# Patient Record
Sex: FEMALE | HISPANIC OR LATINO | Employment: FULL TIME | URBAN - METROPOLITAN AREA
[De-identification: names, ages, dates, MRNs, and addresses within clinical notes are randomized per-mention and may not be internally consistent; named-entity substitution may affect disease eponyms.]

---

## 2022-12-30 ENCOUNTER — HOSPITAL ENCOUNTER (EMERGENCY)
Facility: CLINIC | Age: 42
Discharge: HOME | End: 2022-12-30
Attending: PHYSICIAN ASSISTANT | Admitting: PHYSICIAN ASSISTANT
Payer: COMMERCIAL

## 2022-12-30 VITALS
OXYGEN SATURATION: 99 % | TEMPERATURE: 98 F | HEART RATE: 83 BPM | RESPIRATION RATE: 16 BRPM | DIASTOLIC BLOOD PRESSURE: 85 MMHG | SYSTOLIC BLOOD PRESSURE: 131 MMHG

## 2022-12-30 DIAGNOSIS — R22.41 NODULE OF SKIN OF RIGHT LOWER LEG: ICD-10-CM

## 2022-12-30 PROCEDURE — 99282 EMERGENCY DEPT VISIT SF MDM: CPT

## 2022-12-30 ASSESSMENT — ENCOUNTER SYMPTOMS: MYALGIAS: 1

## 2022-12-30 NOTE — ED TRIAGE NOTES
Patient reports hitting right lower leg on bookshelf yesterday, no discoloration, palpable mass causing pain and tingling in right foot.     Triage Assessment     Row Name 12/30/22 1317       Respiratory WDL    Rhythm/Pattern, Respiratory depth regular;pattern regular       Cardiac WDL    Pulse Rate & Regularity radial pulse regular       RLE Neurovascular Assessment    Temperature RLE warm    Color RLE no discoloration    Sensation RLE tingling present       Cognitive/Neuro/Behavioral WDL    Level of Consciousness alert    Orientation oriented x 4    Speech clear;spontaneous;logical    Mood/Behavior calm;cooperative       Flushing Coma Scale    Best Eye Response 4-->(E4) spontaneous    Best Motor Response 6-->(M6) obeys commands    Best Verbal Response 5-->(V5) oriented    Josiah Coma Scale Score 15

## 2022-12-30 NOTE — ED PROVIDER NOTES
History     Chief Complaint:  Leg Pain     HPI   Shanna Orta is a 42 year old female who presents to the ED for evaluation of leg pain.  Patient notes that she has had a cystlike structure to her right distal leg for approximately 6 years.  She notes that with continued standing and movement she develops pain to this area which at times seems to radiate up her right leg.  She has seen multiple providers with respect to this and has been advised to have the structure removed.  She states that she has opened it up in the past with a small amount of output, however that it came back quickly.  She denies any fevers or chills.  No skin redness.  No trauma.  No chest pain or shortness of breath. No leg swelling.    Independent Historian: yes    ROS:  Review of Systems   Musculoskeletal: Positive for myalgias.   All other systems reviewed and are negative.    Allergies:  Penicillins     Medications:    No medications    Past Medical History:    Reviewed, no pertinent past medical history    Social History:  Here alone.    PCP: No Ref-Primary, Physician     Physical Exam     Patient Vitals for the past 24 hrs:   BP Temp Temp src Pulse Resp SpO2   12/30/22 1314 131/85 98  F (36.7  C) Temporal 83 16 99 %        Physical Exam  Constitutional: Pleasant. Cooperative.  Eyes: Pupils equally round  HENT: Head is normal in appearance. Oropharynx is normal with moist mucus membranes.  Cardiovascular: Regular rate and rhythm without murmurs.  Respiratory: Normal respiratory effort, lungs clear to auscultation  Musculoskeletal: Firm nodule noted to lateral aspect of distal right leg. No fluctuance. Full ROM of bilateral lower extremities. 2+ PT pulses.  Skin: Normal, without rash. No erythema or warmth. No pustule noted.  Lymphatic: No edema  Neurologic: Cranial nerves grossly intact, normal cognition, no apparent deficits.  Psychiatric: Normal affect.  Nursing notes and vital signs reviewed.    Emergency Department Course      Emergency Department Course & Assessments:    Interventions:  Medications - No data to display     Disposition:  The patient was discharged to home.     Impression & Plan      Medical Decision Making:  Shanna Orta is a 42 year old female who presents to the ED for evaluation of leg pain.  Patient notes having a nodule to the lateral aspect of her right leg for the past 6 years.  It intermittently becomes painful, especially with walking.  She has been evaluated for this multiple times and was advised it was likely a cyst and needed the capsule to be removed.  She was told to come to the emergency department for facilitation of this.  Discussed that I would not be removing the capsule here in the emergency department.  Does not appear like an abscess.  No suggestion for DVT or superficial thrombophlebitis.  No suggestion for overlying cellulitis.  Patient referred to dermatology.  Advised Tylenol and ibuprofen for pain.  Felt patient was safe for discharge to home. Discussed reasons to return. All questions answered. Patient discharged to home in stable condition.    Diagnosis:    ICD-10-CM    1. Nodule of skin of right lower leg  R22.41            Discharge Medications:  There are no discharge medications for this patient.       12/30/2022   Ronni Sosa PA-C     This record was created at least in part using electronic voice recognition software, so please excuse any typographical errors.       Ronni Sosa PA-C  12/30/22 0937

## 2023-01-19 ENCOUNTER — OFFICE VISIT (OUTPATIENT)
Dept: DERMATOLOGY | Facility: CLINIC | Age: 43
End: 2023-01-19
Payer: COMMERCIAL

## 2023-01-19 DIAGNOSIS — L72.9 CYST OF SKIN: Primary | ICD-10-CM

## 2023-01-19 DIAGNOSIS — R22.41 NODULE OF SKIN OF RIGHT LOWER LEG: ICD-10-CM

## 2023-01-19 PROCEDURE — 99203 OFFICE O/P NEW LOW 30 MIN: CPT | Mod: GC | Performed by: DERMATOLOGY

## 2023-01-19 ASSESSMENT — PAIN SCALES - GENERAL: PAINLEVEL: MODERATE PAIN (5)

## 2023-01-19 NOTE — PROGRESS NOTES
"[facility] Dermatology Note  Encounter Date: Jan 19, 2023  Office Visit     Dermatology Problem List:  # Cyst vs angiolipoma, right lower extremity  - pending excision    ____________________________________________    Assessment & Plan:   # Cyst vs angiolipoma, right lower extremity  Physical exam as below.  Discussed that this lesion will likely not resolve spontaneously and that surgery is needed for definite removal.  Patient is interested.  Discussed risks including infection, scar, and bleeding.  - Photodocumentation today  - Will schedule in resident excision next available    Procedures Performed:   None    Follow-up: prn for new or changing lesions    Staff and Resident:     Kelton Johnson MD  PGY-3 Dermatology  Pager: [phone]    I have personally examined this patient and agree with the resident doctor's documentation and plan of care. I have reviewed and amended the resident's note above. The documentation accurately reflects my clinical observations, diagnoses, treatment and follow-up plans.     Heather Alonso MD  Dermatology Staff    ____________________________________________    CC: Derm Problem (Pt reports \"ball\" or nodule on R lower leg)    HPI:  Ms. Shanna Orta is a(n) 42 year old female who presents today as a new patient for lesion of concern.    Patient has had a ball underneath her skin on her right lower extremity for the past 6 years.  She states that this has started as a spider bite.  It has been intermittently inflamed and tender over the past couple of years and she feels that it is growing in size.  She has tried to pop it before and states she was only able to get water out.  She has no other lesions or concerns today.    Labs Reviewed:  N/A    Physical Exam:  Vitals: There were no vitals taken for this visit.  SKIN: Focused examination of RLE was performed.  - RLE with 2cm subcutaneous mobile nodule without overlying erythema or central punctum noted  - No " other lesions of concern on areas examined.     Medications:  Current Outpatient Medications   Medication     UNABLE TO FIND     No current facility-administered medications for this visit.      Past Medical History:   There is no problem list on file for this patient.    History reviewed. No pertinent past medical history.    CC Ronni Sosa PA-C  [facility]  [address] [place] [address] on close of this encounter.

## 2023-01-19 NOTE — NURSING NOTE
"Chief Complaint   Patient presents with     Derm Problem     Pt reports \"ball\" or nodule on R lower leg     Nate Cummings, EMT    "

## 2023-01-19 NOTE — LETTER
"1/19/2023       RE: Shanna Orta  [address]  [facility] [facility] [address]     Dear Colleague,    Thank you for referring your patient, Shanna Orta, to the [facility] DERMATOLOGY CLINIC [place] at [facility]. Please see a copy of my visit note below.    [facility] Dermatology Note  Encounter Date: Jan 19, 2023  Office Visit     Dermatology Problem List:  # Cyst vs angiolipoma, right lower extremity  - pending excision    ____________________________________________    Assessment & Plan:   # Cyst vs angiolipoma, right lower extremity  Physical exam as below.  Discussed that this lesion will likely not resolve spontaneously and that surgery is needed for definite removal.  Patient is interested.  Discussed risks including infection, scar, and bleeding.  - Photodocumentation today  - Will schedule in resident excision next available    Procedures Performed:   None    Follow-up: prn for new or changing lesions    Staff and Resident:     Kelton Johnson MD  PGY-3 Dermatology  Pager: [phone]    I have personally examined this patient and agree with the resident doctor's documentation and plan of care. I have reviewed and amended the resident's note above. The documentation accurately reflects my clinical observations, diagnoses, treatment and follow-up plans.     Heather Alonso MD  Dermatology Staff    ____________________________________________    CC: Derm Problem (Pt reports \"ball\" or nodule on R lower leg)    HPI:  Ms. Shanna Orta is a(n) 42 year old female who presents today as a new patient for lesion of concern.    Patient has had a ball underneath her skin on her right lower extremity for the past 6 years.  She states that this has started as a spider bite.  It has been intermittently inflamed and tender over the past couple of years and she feels that it is growing in size.  She has tried to pop it before and " states she was only able to get water out.  She has no other lesions or concerns today.    Labs Reviewed:  N/A    Physical Exam:  Vitals: There were no vitals taken for this visit.  SKIN: Focused examination of RLE was performed.  - RLE with 2cm subcutaneous mobile nodule without overlying erythema or central punctum noted  - No other lesions of concern on areas examined.     Medications:  Current Outpatient Medications   Medication     UNABLE TO FIND     No current facility-administered medications for this visit.      Past Medical History:   There is no problem list on file for this patient.    History reviewed. No pertinent past medical history.    CC Ronni Sosa PA-C  [facility]  [address] [place] [address] on close of this encounter.      Again, thank you for allowing me to participate in the care of your patient.      Sincerely,    Kelton Johnson MD

## 2023-03-23 ENCOUNTER — OFFICE VISIT (OUTPATIENT)
Dept: DERMATOLOGY | Facility: CLINIC | Age: 43
End: 2023-03-23
Payer: COMMERCIAL

## 2023-03-23 VITALS — DIASTOLIC BLOOD PRESSURE: 69 MMHG | HEART RATE: 74 BPM | SYSTOLIC BLOOD PRESSURE: 108 MMHG

## 2023-03-23 DIAGNOSIS — D48.9 NEOPLASM OF UNCERTAIN BEHAVIOR: ICD-10-CM

## 2023-03-23 DIAGNOSIS — D17.23 LIPOMA OF RIGHT LOWER EXTREMITY: Primary | ICD-10-CM

## 2023-03-23 PROCEDURE — 27632 EXC LEG/ANKLE LES SC 3 CM/>: CPT | Mod: GC | Performed by: STUDENT IN AN ORGANIZED HEALTH CARE EDUCATION/TRAINING PROGRAM

## 2023-03-23 PROCEDURE — 88305 TISSUE EXAM BY PATHOLOGIST: CPT | Mod: 26 | Performed by: DERMATOLOGY

## 2023-03-23 PROCEDURE — 88305 TISSUE EXAM BY PATHOLOGIST: CPT | Mod: TC | Performed by: STUDENT IN AN ORGANIZED HEALTH CARE EDUCATION/TRAINING PROGRAM

## 2023-03-23 RX ORDER — DOXYCYCLINE 100 MG/1
CAPSULE ORAL
Qty: 20 CAPSULE | Refills: 0 | Status: SHIPPED | OUTPATIENT
Start: 2023-03-23

## 2023-03-23 RX ORDER — DIPHENHYDRAMINE HCL 25 MG
25 CAPSULE ORAL
COMMUNITY

## 2023-03-23 RX ORDER — DOXYCYCLINE 100 MG/1
CAPSULE ORAL
Qty: 20 CAPSULE | Refills: 0 | Status: SHIPPED | OUTPATIENT
Start: 2023-03-23 | End: 2023-03-23

## 2023-03-23 NOTE — PROGRESS NOTES
Lidocaine-epinephrine 1-1:865435 % injection   5.0 mL once for one use, starting 3/23/2023 ending 3/23/2023,  2mL disp, R-0, injection  Injected by Dr. Yanes

## 2023-03-23 NOTE — NURSING NOTE
Dermatology Rooming Note    Shanna Orta's goals for this visit include:   Chief Complaint   Patient presents with     Derm Problem     Cyst excision on right leg.     Osito Deleon, EMT-B

## 2023-03-23 NOTE — PROGRESS NOTES
DERMATOLOGIC SURGERY REPORT    NAME OF PROCEDURE:  EXCISION AND INTERMEDIATE CLOSURE    Resident Surgeon:  Dr. Lilian Yanes, PGY3  Staff surgeon: Dr. Shahzad Sam    PREOPERATIVE DIAGNOSIS: LIPOMA  POSTOPERATIVE DIAGNOSIS: Same  FINAL EXCISION SIZE: 4 cm lesion with N/A mm margins  TOTAL EXCISED DIAMETER: 3 cm  FINAL REPAIR LENGTH:  3 cm    INDICATIONS:  This patient presented with a 4 cm x 4 cm rubbery mobile nodule on the lateral right leg. Excision was indicated. We discussed the principles of treatment and most likely complications including bleeding, infection, wound dehiscence, pain, nerve damage, and scarring. Informed consent was obtained and the patient underwent the procedure as follows.    PROCEDURE:  The patient was taken to the operative suite. The treatment area was anesthetized with 1% lidocaine with 1:[id number] epinephrine buffered with bicarbonate. The area was washed with Hibiclens, rinsed with saline and draped with sterile towels. The lesion was delineated and excised down to subcutaneous fat. Hemostasis was obtained by electrocoagulation. Margins were not applicable to this excision due to the strong clinical suspision of a benign lesion; the final excision size was 3 cm.     REPAIR:  In order to repair this defect while maintaining the normal anatomic relations and function, we elected to utilize an intermediate linear closure. Closure was oriented so that the wound was in the patient's natural skin tension lines. Deeper layers of the subcutaneous tissue were undermined first. Deep dermal and subcutaneous layer closure was performed using 4-0 monocryl deep, intradermal and subcutaneous sutures. Two redundant columns were removed by triangulation. Final cutaneous approximation was achieved with 4-0 Prolene simple running sutures.      The final wound length was 3 cm.  A total of 3 ml of anesthesia was administered for all surgical sites. Estimated blood loss was less than 2 mL for all surgical  sites. A sterile pressure dressing was applied and wound care instructions, with a written handout, were given. The patient was discharged from the Clinics and Surgery Center alert and ambulatory.    Note: Given surgical site at the lower extremities and patient preference, the decision was made to start the patient on antibiotic prophylaxis via of a course of doxycycline 100 mg BID for 10 days.     Resident: Lilian Yanes MD  PGY-3, Internal Medicine-Dermatology  Pager: *[phone]     Staff: Dr. Shahzad Sam

## 2023-03-23 NOTE — LETTER
3/23/2023       RE: Shanna Orta  [address]  [place] [address]     Dear Colleague,    Thank you for referring your patient, Shanna Orta, to the [facility] DERMATOLOGY CLINIC [place] at [facility]. Please see a copy of my visit note below.    DERMATOLOGIC SURGERY REPORT    NAME OF PROCEDURE:  EXCISION AND INTERMEDIATE CLOSURE    Resident Surgeon:  Dr. Lilian Yanes, PGY3  Staff surgeon: Dr. Shahzad Sam    PREOPERATIVE DIAGNOSIS: LIPOMA  POSTOPERATIVE DIAGNOSIS: Same  FINAL EXCISION SIZE: 4 cm lesion with N/A mm margins  TOTAL EXCISED DIAMETER: 3 cm  FINAL REPAIR LENGTH:  3 cm    INDICATIONS:  This patient presented with a 4 cm x 4 cm rubbery mobile nodule on the lateral right leg. Excision was indicated. We discussed the principles of treatment and most likely complications including bleeding, infection, wound dehiscence, pain, nerve damage, and scarring. Informed consent was obtained and the patient underwent the procedure as follows.    PROCEDURE:  The patient was taken to the operative suite. The treatment area was anesthetized with 1% lidocaine with 1:[id number] epinephrine buffered with bicarbonate. The area was washed with Hibiclens, rinsed with saline and draped with sterile towels. The lesion was delineated and excised down to subcutaneous fat. Hemostasis was obtained by electrocoagulation. Margins were not applicable to this excision due to the strong clinical suspision of a benign lesion; the final excision size was 3 cm.     REPAIR:  In order to repair this defect while maintaining the normal anatomic relations and function, we elected to utilize an intermediate linear closure. Closure was oriented so that the wound was in the patient's natural skin tension lines. Deeper layers of the subcutaneous tissue were undermined first. Deep dermal and subcutaneous layer closure was performed using 4-0 monocryl deep, intradermal  and subcutaneous sutures. Two redundant columns were removed by triangulation. Final cutaneous approximation was achieved with 4-0 Prolene simple running sutures.      The final wound length was 3 cm.  A total of 3 ml of anesthesia was administered for all surgical sites. Estimated blood loss was less than 2 mL for all surgical sites. A sterile pressure dressing was applied and wound care instructions, with a written handout, were given. The patient was discharged from the [facility] alert and ambulatory.    Note: Given surgical site at the lower extremities and patient preference, the decision was made to start the patient on antibiotic prophylaxis via of a course of doxycycline 100 mg BID for 10 days.     Resident: Lilian Yanes MD  PGY-3, Internal Medicine-Dermatology  Pager: *[phone]     Staff: Dr. Shahzad Sam      Lidocaine-epinephrine 1-1:502331 % injection   5.0 mL once for one use, starting 3/23/2023 ending 3/23/2023,  2mL disp, R-0, injection  Injected by Dr. Yanes

## 2023-03-26 ENCOUNTER — NURSE TRIAGE (OUTPATIENT)
Dept: NURSING | Facility: CLINIC | Age: 43
End: 2023-03-26
Payer: COMMERCIAL

## 2023-03-26 LAB
PATH REPORT.COMMENTS IMP SPEC: NORMAL
PATH REPORT.COMMENTS IMP SPEC: NORMAL
PATH REPORT.FINAL DX SPEC: NORMAL
PATH REPORT.GROSS SPEC: NORMAL
PATH REPORT.MICROSCOPIC SPEC OTHER STN: NORMAL
PATH REPORT.RELEVANT HX SPEC: NORMAL

## 2023-03-26 NOTE — TELEPHONE ENCOUNTER
"Pt reports right leg pain \"behind knee and area of knee\" which started this morning, constant. Pt reports pain radiates to calf. Pt describes pain as aching. Pt rates pain \"6-7\". Pt is not taking pain medications. Pt had lipoma removal in that leg 3/23/23 and denies pain or erythema in that area \"looks normal\".    Advised pt to be seen within four hours per protocol. Writer recommended pt be seen at [facility] as they have ultrasound capability.    Pt verbalizes understanding and agrees to plan.     Reason for Disposition    [1] Thigh or calf pain AND [2] only 1 side AND [3] present > 1 hour    Additional Information    Negative: Sounds like a life-threatening emergency to the triager    Negative: Followed a knee injury    Negative: Leg pain is main symptom    Negative: Knee swelling is main symptom    Negative: [1] Swollen joint AND [2] fever    Negative: [1] Red area or streak AND [2] fever    Negative: Patient sounds very sick or weak to the triager    Negative: [1] SEVERE pain (e.g., excruciating, unable to walk) AND [2] not improved after 2 hours of pain medicine    Negative: [1] Can't move swollen joint at all AND [2] no fever    Protocols used: KNEE PAIN-A-AH      "

## 2023-03-27 NOTE — RESULT ENCOUNTER NOTE
Called patient to discuss results. Biopsy returned as a schwannoma. Counseled patient that this represents a benign overgrowth of cells that cover nerve fibers, does not represent cancer or other malignancy, and no further treatment is required. Patient expressed understanding and appreciation.     Staffed case with Dr. Raymond as Dr. Shahzad Sam is out for the next few weeks. Dr. Raymond is cc'd as FYI only.     Clinic schedulers, please schedule this patient for suture removal (Nurse visit) at or around April 6th, thank you.

## 2023-04-06 ENCOUNTER — ALLIED HEALTH/NURSE VISIT (OUTPATIENT)
Dept: DERMATOLOGY | Facility: CLINIC | Age: 43
End: 2023-04-06
Payer: COMMERCIAL

## 2023-04-06 DIAGNOSIS — Z48.02 VISIT FOR SUTURE REMOVAL: Primary | ICD-10-CM

## 2023-04-06 PROCEDURE — 99207 PR NO CHARGE NURSE ONLY: CPT | Performed by: DERMATOLOGY

## 2023-04-06 NOTE — NURSING NOTE
"Removed continuous suture, from right outer calf without complication.  Patient tolerated procedure well, and understood all followup instructions. No S/S of infection. Slight end stage bruising (purplish near incison with green edges).    Shanna Orta comes into clinic today at the request of Dr. Yanes, Ordering Provider for Suture Removal.    This service provided today was under the supervising provider of the day Dr. Mckinney, who was available if needed.    Nitza Lee RN      Pt limping on right leg upon arrival, during discussion pt stated the back part of her right leg hurts like a muscle ache. Sometimes the pain radiates from the back of her calf up through the back of her thigh, sometimes it is just behind the knee. She walked 8 blocks the other day and afterwards it hurt so bad that she felt like she was going to throw up and couldn't sleep that night from the pain. Pt states she has not taken anything to try to help the pain such as Tylenol. Pt denies pain around the incision site. Pt states she has been limping and \"Babying\" the right leg since biopsy was completed. Will send message to Dr. Yanes's team for possible follow up. Informed pt to reach out to primary care physician, pt stated she does not have one.    "

## 2023-04-07 ENCOUNTER — DOCUMENTATION ONLY (OUTPATIENT)
Dept: DERMATOLOGY | Facility: CLINIC | Age: 43
End: 2023-04-07
Payer: COMMERCIAL

## 2023-04-07 NOTE — PROGRESS NOTES
Contacted as dermatology resident on-call by nurse who removed sutures from patient's excision site on her right lower leg yesterday. Patient had excision of a schwannoma on R lower leg on 3/23/23.  Suture removal visit patient reported pain with walking of the area.  Nursing at time of suture removal noted no signs of infection of the site.    Called and talked with patient.  Patient reports soreness at the site as well as behind the knee and sometimes up the back of the leg with walking.  Says that it feels like a muscle type pain.  Patient wondering if this is normal.  Taking Tylenol for the pain.    Discussed with patient that some persistent soreness of the area can be normal.  Recommended that she slowly increase her activity as tolerated.  Also recommended ice, massaging, and stretching of the right leg regularly.  Encouraged the patient to let us know if this pain does not improve over the next week.    Discussed with Dr. Cortez.     Kennedi Ocampo MD  Dermatology Resident, PGY4

## 2023-04-30 ENCOUNTER — HOSPITAL ENCOUNTER (EMERGENCY)
Facility: CLINIC | Age: 43
Discharge: HOME | End: 2023-04-30
Attending: EMERGENCY MEDICINE | Admitting: EMERGENCY MEDICINE
Payer: COMMERCIAL

## 2023-04-30 VITALS
DIASTOLIC BLOOD PRESSURE: 74 MMHG | SYSTOLIC BLOOD PRESSURE: 115 MMHG | RESPIRATION RATE: 18 BRPM | HEART RATE: 72 BPM | OXYGEN SATURATION: 100 % | TEMPERATURE: 97.2 F

## 2023-04-30 DIAGNOSIS — R52 PAIN OF RIGHT SIDE OF BODY: ICD-10-CM

## 2023-04-30 DIAGNOSIS — R74.8 ELEVATED LIVER ENZYMES: ICD-10-CM

## 2023-04-30 DIAGNOSIS — R51.9 NONINTRACTABLE HEADACHE, UNSPECIFIED CHRONICITY PATTERN, UNSPECIFIED HEADACHE TYPE: ICD-10-CM

## 2023-04-30 LAB
ALBUMIN SERPL BCG-MCNC: 4.5 G/DL (ref 3.5–5.2)
ALBUMIN UR-MCNC: NEGATIVE MG/DL
ALP SERPL-CCNC: 82 U/L (ref 35–104)
ALT SERPL W P-5'-P-CCNC: 93 U/L (ref 10–35)
ANION GAP SERPL CALCULATED.3IONS-SCNC: 12 MMOL/L (ref 7–15)
APPEARANCE UR: CLEAR
AST SERPL W P-5'-P-CCNC: 73 U/L (ref 10–35)
ATRIAL RATE - MUSE: 69 BPM
BILIRUB DIRECT SERPL-MCNC: <0.2 MG/DL (ref 0–0.3)
BILIRUB SERPL-MCNC: 0.6 MG/DL
BILIRUB UR QL STRIP: NEGATIVE
BUN SERPL-MCNC: 8.5 MG/DL (ref 6–20)
CALCIUM SERPL-MCNC: 9.4 MG/DL (ref 8.6–10)
CHLORIDE SERPL-SCNC: 104 MMOL/L (ref 98–107)
COLOR UR AUTO: YELLOW
CREAT SERPL-MCNC: 0.69 MG/DL (ref 0.51–0.95)
DEPRECATED HCO3 PLAS-SCNC: 22 MMOL/L (ref 22–29)
DIASTOLIC BLOOD PRESSURE - MUSE: NORMAL MMHG
ERYTHROCYTE [DISTWIDTH] IN BLOOD BY AUTOMATED COUNT: 13.2 % (ref 10–15)
FLUAV RNA SPEC QL NAA+PROBE: NEGATIVE
FLUBV RNA RESP QL NAA+PROBE: NEGATIVE
GFR SERPL CREATININE-BSD FRML MDRD: >90 ML/MIN/1.73M2
GLUCOSE SERPL-MCNC: 99 MG/DL (ref 70–99)
GLUCOSE UR STRIP-MCNC: NEGATIVE MG/DL
HCG UR QL: NEGATIVE
HCT VFR BLD AUTO: 41.5 % (ref 35–47)
HGB BLD-MCNC: 14 G/DL (ref 11.7–15.7)
HGB UR QL STRIP: ABNORMAL
INTERPRETATION ECG - MUSE: NORMAL
KETONES UR STRIP-MCNC: NEGATIVE MG/DL
LEUKOCYTE ESTERASE UR QL STRIP: NEGATIVE
MCH RBC QN AUTO: 29.9 PG (ref 26.5–33)
MCHC RBC AUTO-ENTMCNC: 33.7 G/DL (ref 31.5–36.5)
MCV RBC AUTO: 89 FL (ref 78–100)
MUCOUS THREADS #/AREA URNS LPF: PRESENT /LPF
NITRATE UR QL: NEGATIVE
P AXIS - MUSE: 18 DEGREES
PH UR STRIP: 5.5 [PH] (ref 5–7)
PLATELET # BLD AUTO: 279 10E3/UL (ref 150–450)
POTASSIUM SERPL-SCNC: 4.3 MMOL/L (ref 3.4–5.3)
PR INTERVAL - MUSE: 166 MS
PROT SERPL-MCNC: 7.2 G/DL (ref 6.4–8.3)
QRS DURATION - MUSE: 78 MS
QT - MUSE: 412 MS
QTC - MUSE: 441 MS
R AXIS - MUSE: 26 DEGREES
RBC # BLD AUTO: 4.68 10E6/UL (ref 3.8–5.2)
RBC URINE: 8 /HPF
RSV RNA SPEC NAA+PROBE: NEGATIVE
SARS-COV-2 RNA RESP QL NAA+PROBE: NEGATIVE
SODIUM SERPL-SCNC: 138 MMOL/L (ref 136–145)
SP GR UR STRIP: 1.03 (ref 1–1.03)
SQUAMOUS EPITHELIAL: 4 /HPF
SYSTOLIC BLOOD PRESSURE - MUSE: NORMAL MMHG
T AXIS - MUSE: 8 DEGREES
TSH SERPL DL<=0.005 MIU/L-ACNC: 1.67 UIU/ML (ref 0.3–4.2)
UROBILINOGEN UR STRIP-MCNC: NORMAL MG/DL
VENTRICULAR RATE- MUSE: 69 BPM
WBC # BLD AUTO: 5.7 10E3/UL (ref 4–11)
WBC URINE: 1 /HPF

## 2023-04-30 PROCEDURE — 99284 EMERGENCY DEPT VISIT MOD MDM: CPT | Mod: 25,CS

## 2023-04-30 PROCEDURE — 85027 COMPLETE CBC AUTOMATED: CPT | Performed by: EMERGENCY MEDICINE

## 2023-04-30 PROCEDURE — 96375 TX/PRO/DX INJ NEW DRUG ADDON: CPT

## 2023-04-30 PROCEDURE — 81001 URINALYSIS AUTO W/SCOPE: CPT | Performed by: EMERGENCY MEDICINE

## 2023-04-30 PROCEDURE — 87637 SARSCOV2&INF A&B&RSV AMP PRB: CPT | Performed by: EMERGENCY MEDICINE

## 2023-04-30 PROCEDURE — 96374 THER/PROPH/DIAG INJ IV PUSH: CPT

## 2023-04-30 PROCEDURE — 84443 ASSAY THYROID STIM HORMONE: CPT | Performed by: EMERGENCY MEDICINE

## 2023-04-30 PROCEDURE — 80048 BASIC METABOLIC PNL TOTAL CA: CPT | Performed by: EMERGENCY MEDICINE

## 2023-04-30 PROCEDURE — 80053 COMPREHEN METABOLIC PANEL: CPT | Performed by: EMERGENCY MEDICINE

## 2023-04-30 PROCEDURE — 258N000003 HC RX IP 258 OP 636: Performed by: EMERGENCY MEDICINE

## 2023-04-30 PROCEDURE — 96361 HYDRATE IV INFUSION ADD-ON: CPT

## 2023-04-30 PROCEDURE — 93005 ELECTROCARDIOGRAM TRACING: CPT

## 2023-04-30 PROCEDURE — C9803 HOPD COVID-19 SPEC COLLECT: HCPCS

## 2023-04-30 PROCEDURE — 81025 URINE PREGNANCY TEST: CPT | Performed by: EMERGENCY MEDICINE

## 2023-04-30 PROCEDURE — 82248 BILIRUBIN DIRECT: CPT | Performed by: EMERGENCY MEDICINE

## 2023-04-30 PROCEDURE — 250N000011 HC RX IP 250 OP 636: Performed by: EMERGENCY MEDICINE

## 2023-04-30 PROCEDURE — 36415 COLL VENOUS BLD VENIPUNCTURE: CPT | Performed by: EMERGENCY MEDICINE

## 2023-04-30 RX ORDER — DIPHENHYDRAMINE HCL 25 MG
25 CAPSULE ORAL ONCE
Status: DISCONTINUED | OUTPATIENT
Start: 2023-04-30 | End: 2023-04-30

## 2023-04-30 RX ORDER — KETOROLAC TROMETHAMINE 15 MG/ML
15 INJECTION, SOLUTION INTRAMUSCULAR; INTRAVENOUS ONCE
Status: COMPLETED | OUTPATIENT
Start: 2023-04-30 | End: 2023-04-30

## 2023-04-30 RX ORDER — DIPHENHYDRAMINE HYDROCHLORIDE 50 MG/ML
25 INJECTION INTRAMUSCULAR; INTRAVENOUS ONCE
Status: COMPLETED | OUTPATIENT
Start: 2023-04-30 | End: 2023-04-30

## 2023-04-30 RX ORDER — METOCLOPRAMIDE HYDROCHLORIDE 5 MG/ML
10 INJECTION INTRAMUSCULAR; INTRAVENOUS ONCE
Status: COMPLETED | OUTPATIENT
Start: 2023-04-30 | End: 2023-04-30

## 2023-04-30 RX ADMIN — KETOROLAC TROMETHAMINE 15 MG: 15 INJECTION, SOLUTION INTRAMUSCULAR; INTRAVENOUS at 13:01

## 2023-04-30 RX ADMIN — METOCLOPRAMIDE 10 MG: 5 INJECTION, SOLUTION INTRAMUSCULAR; INTRAVENOUS at 13:04

## 2023-04-30 RX ADMIN — DIPHENHYDRAMINE HYDROCHLORIDE 25 MG: 50 INJECTION, SOLUTION INTRAMUSCULAR; INTRAVENOUS at 13:06

## 2023-04-30 RX ADMIN — SODIUM CHLORIDE 1000 ML: 9 INJECTION, SOLUTION INTRAVENOUS at 13:00

## 2023-04-30 ASSESSMENT — ACTIVITIES OF DAILY LIVING (ADL)
ADLS_ACUITY_SCORE: 35
ADLS_ACUITY_SCORE: 35

## 2023-04-30 NOTE — ED TRIAGE NOTES
Pt sts she has muscles aches mostly on right side for over 1 week     Triage Assessment     Row Name 04/30/23 0949       Triage Assessment (Adult)    Airway WDL WDL       Respiratory WDL    Respiratory WDL WDL       Skin Circulation/Temperature WDL    Skin Circulation/Temperature WDL WDL       Cardiac WDL    Cardiac WDL WDL       Peripheral/Neurovascular WDL    Peripheral Neurovascular WDL WDL       Cognitive/Neuro/Behavioral WDL    Cognitive/Neuro/Behavioral WDL WDL

## 2023-04-30 NOTE — ED NOTES
"Patient reports headache pain is \"very bad\". Meds given per orders. Pt repositioned. Lights dimmed. Call light reviewed and in reach.   "

## 2023-04-30 NOTE — DISCHARGE INSTRUCTIONS
Discharge Instructions  Headache    You were seen today for a headache. Headaches may be caused by many different things such as muscle tension, sinus inflammation, anxiety and stress, having too little sleep, too much alcohol, some medical conditions or injury. You may have a migraine, which is caused by changes in the blood vessels in your head.  At this time your provider does not find that your headache is a sign of anything dangerous or life-threatening.  However, sometimes the signs of serious illness do not show up right away.      Generally, every Emergency Department visit should have a follow-up clinic visit with either a primary or a specialty clinic/provider. Please follow-up as instructed by your emergency provider today.    Return to the Emergency Department if:  You get a new fever of 100.4 F or higher.  Your headache gets much worse.  You get a stiff neck with your headache.  You get a new headache that is significantly different or worse than headaches you have had before.  You are vomiting (throwing up) and cannot keep food or water down.  You have blurry or double vision or other problems with your eyes.  You have a new weakness on one side of your body.  You have difficulty with balance which is new.  You or your family thinks you are confused.  You have a seizure.    What can I do to help myself?  Pain medications - You may take a pain medication such as Tylenol  (acetaminophen), Advil , Motrin  (ibuprofen) or Aleve  (naproxen).  Take a pain reliever as soon as you notice symptoms.  Starting medications as soon as you start to have symptoms may lessen the amount of pain you have.  Relaxing in a quiet, dark room may help.  Get enough sleep and eat meals regularly.  You may need to watch for certain foods or other things which may trigger your headaches.  Keeping a journal of your headaches and possible triggers may help you and your primary provider to identify things which you should avoid which  may be causing your headaches.  If you were given a prescription for medicine here today, be sure to read all of the information (including the package insert) that comes with your prescription.  This will include important information about the medicine, its side effects, and any warnings that you need to know about.  The pharmacist who fills the prescription can provide more information and answer questions you may have about the medicine.  If you have questions or concerns that the pharmacist cannot address, please call or return to the Emergency Department.   Remember that you can always come back to the Emergency Department if you are not able to see your regular provider in the amount of time listed above, if you get any new symptoms, or if there is anything that worries you.    Discharge Instructions  Incidental Findings    An incidental finding is something unexpected that was found while you were being treated and is felt to not be related to the reason that you came to the Emergency Department.  While this finding is not an emergency, you need to follow up with your primary provider (or occasionally a specialist) to determine if anything should be done about it.    These findings can come from:  Checking your vital signs (example: high blood pressure).  Taking your history (example: unexplained weight loss).  The physical exam (example: a heart murmur).  Laboratory study (example: anemia or low blood count).  X-rays/ultrasound/CT or other imaging (example: an unexplained mass).    Generally, every Emergency Department visit should have a follow-up clinic visit with either a primary or a specialty clinic/provider. Please follow-up as instructed by your emergency provider today.    Return to the Emergency Department if:  Your condition worsens.  You develop unexpected pain.  You now develop new symptoms or have new concerns.  If you were given a prescription for medicine here today, be sure to read all of  the information (including the package insert) that comes with your prescription.  This will include important information about the medicine, its side effects, and any warnings that you need to know about.  The pharmacist who fills the prescription can provide more information and answer questions you may have about the medicine.  If you have questions or concerns that the pharmacist cannot address, please call or return to the Emergency Department.   Remember that you can always come back to the Emergency Department if you are not able to see your regular provider in the amount of time listed above, if you get any new symptoms, or if there is anything that worries you.

## 2023-04-30 NOTE — ED PROVIDER NOTES
History     Chief Complaint:  Fever       HPI   Shanna Orta is a 43 year old female who presents for evaluation of right-sided body pain and headache.  Symptoms have been ongoing for a while now.  She recently had a cyst removed from her right lower leg that turned out to be a schwannoma.  She been having increasing pain in that area and then also developed pain throughout her entire right leg and right arm today.  This has been ongoing for the last few days to week.  She does not have numbness or weakness of the right arm or right leg.  She is also having a headache which is new for her.  Is been ongoing for a few days.  No fevers or chills.  No trauma to the head.  No vision changes.  No numbness or weakness.  She is otherwise healthy and takes no medications.  No known sick contacts.      Independent Historian:   None - Patient Only    Review of External Notes: Dermatology note dated 3/23/2023 where patient was notified of results of the lesion removed from the right shin was a schwannoma no further treatment or testing is needed.    ROS:  Review of Systems   Constitutional: Negative for chills and fever.   Respiratory: Negative for cough and shortness of breath.    Cardiovascular: Negative for chest pain.   Gastrointestinal: Negative for abdominal pain, diarrhea, nausea and vomiting.   Genitourinary: Negative for dysuria.   Skin: Positive for wound (Surgical). Negative for rash.   Neurological: Positive for headaches. Negative for dizziness, speech difficulty, weakness, light-headedness and numbness.   Psychiatric/Behavioral: Negative for confusion.   All other systems reviewed and are negative.      Allergies:  Penicillins     Medications:    diphenhydrAMINE (BENADRYL) 25 MG capsule  doxycycline monohydrate (MONODOX) 100 MG capsule  UNABLE TO FIND        Past Medical History:    No past medical history on file.    Past Surgical History:    No past surgical history on file.     Family History:     family history is not on file.    Social History:   reports that she has never smoked. She has never used smokeless tobacco.  PCP: No Ref-Primary, Physician     Physical Exam   Patient Vitals for the past 24 hrs:   BP Temp Temp src Pulse Resp SpO2   04/30/23 1400 -- -- -- -- -- 100 %   04/30/23 1345 -- -- -- -- -- 100 %   04/30/23 1330 110/81 -- -- 72 -- 100 %   04/30/23 1230 114/76 -- -- -- -- 97 %   04/30/23 0946 115/69 97.2  F (36.2  C) Temporal 80 18 98 %        Physical Exam  Constitutional: Well appearing.  HEENT: Atraumatic.  PERRL.  EOMI.  Moist mucous membranes.  Neck: Soft.  Supple.  Cardiac: Regular rate and rhythm.  No murmur or rub.  Distal pulses are intact and symmetric.  Respiratory: Clear to auscultation bilaterally.  No respiratory distress.  No wheezing, rhonchi, or rales.  Abdomen: Soft and nontender.  No rebound or guarding.  Nondistended.  Musculoskeletal: No edema.  Normal range of motion.  Neurologic: Alert and oriented x3.  Normal tone and bulk.  No facial drooping.  Normal speech.  5/5 strength in bilateral upper and lower extremities.  Sensation to light touch intact throughout.  Normal gait.  Skin: Well-healing surgical scar on the right anterior shin with no surrounding erythema, induration, or fluctuance.  No rashes.  No edema.  Psych: Normal affect.  Normal behavior.      Emergency Department Course     ECG results from 04/30/23   EKG 12-lead, tracing only     Value    Systolic Blood Pressure     Diastolic Blood Pressure     Ventricular Rate 69    Atrial Rate 69    CA Interval 166    QRS Duration 78        QTc 441    P Axis 18    R AXIS 26    T Axis 8    Interpretation ECG      Sinus rhythm with sinus arrhythmia  Low voltage QRS  Borderline ECG  No previous ECGs available  Confirmed by GENERATED REPORT, COMPUTER ([id number]),  Nat Jaffe ([id number]) on 4/30/2023 2:38:38 PM         Laboratory:  Labs Ordered and Resulted from Time of ED Arrival to Time of ED Departure   HEPATIC  FUNCTION PANEL - Abnormal       Result Value    Protein Total 7.2      Albumin 4.5      Bilirubin Total 0.6      Alkaline Phosphatase 82      AST 73 (*)     ALT 93 (*)     Bilirubin Direct <0.20     ROUTINE UA WITH MICROSCOPIC REFLEX TO CULTURE - Abnormal    Color Urine Yellow      Appearance Urine Clear      Glucose Urine Negative      Bilirubin Urine Negative      Ketones Urine Negative      Specific Gravity Urine 1.027      Blood Urine Small (*)     pH Urine 5.5      Protein Albumin Urine Negative      Urobilinogen Urine Normal      Nitrite Urine Negative      Leukocyte Esterase Urine Negative      Mucus Urine Present (*)     RBC Urine 8 (*)     WBC Urine 1      Squamous Epithelials Urine 4 (*)    CBC WITH PLATELETS - Normal    WBC Count 5.7      RBC Count 4.68      Hemoglobin 14.0      Hematocrit 41.5      MCV 89      MCH 29.9      MCHC 33.7      RDW 13.2      Platelet Count 279     BASIC METABOLIC PANEL - Normal    Sodium 138      Potassium 4.3      Chloride 104      Carbon Dioxide (CO2) 22      Anion Gap 12      Urea Nitrogen 8.5      Creatinine 0.69      Calcium 9.4      Glucose 99      GFR Estimate >90     HCG QUALITATIVE URINE - Normal    hCG Urine Qualitative Negative     INFLUENZA A/B, RSV, & SARS-COV2 PCR - Normal    Influenza A PCR Negative      Influenza B PCR Negative      RSV PCR Negative      SARS CoV2 PCR Negative     TSH WITH FREE T4 REFLEX - Normal    TSH 1.67          Procedures     Emergency Department Course & Assessments:             Interventions:  Medications   0.9% sodium chloride BOLUS (0 mLs Intravenous Stopped 4/30/23 1340)   metoclopramide (REGLAN) injection 10 mg (10 mg Intravenous $Given 4/30/23 1304)   ketorolac (TORADOL) injection 15 mg (15 mg Intravenous $Given 4/30/23 1301)   diphenhydrAMINE (BENADRYL) injection 25 mg (25 mg Intravenous $Given 4/30/23 1306)        Assessments:      Independent Interpretation (X-rays, CTs, rhythm strip):  None    Consultations/Discussion of  Management or Tests:  None        Social Determinants of Health affecting care:   None    Disposition:  The patient was discharged to home.     Impression & Plan    Medical Decision Making:  Shanna Orta is a 43-year-old woman who is afebrile and hemodynamically stable.  She is neurologically intact with no focal deficits.  There is no evidence of acute CVA.  I see no occasion for emergent head imaging.  She has no fever or meningismus my concern for meningitis/encephalitis is exceedingly low.  She has no numbness or weakness and has tingling on just one side of her body making a transverse myelitis or other cord issue less likely.  I gave her the migraine cocktail as above with complete resolution of all of her symptoms.  We discussed potential migraines versus other headache syndrome versus other.  At this point, I think she is safe to discharge home with very close primary care follow-up.  There is no evidence of any complication from the removal of her small lesion on the shin with no signs of infection and she is neurovascularly intact in the lower leg.  No indication for imaging.  She is in agreement with this plan and feels much more comfortable and would like to go home.  Strict return precautions were given and her questions were answered.  She was in no distress at time of discharge.    Diagnosis:    ICD-10-CM    1. Nonintractable headache, unspecified chronicity pattern, unspecified headache type  R51.9       2. Pain of right side of body  R52       3. Elevated liver enzymes  R74.8            Discharge Medications:  New Prescriptions    No medications on file      4/30/2023   Duane Mary MD Salay, Nicholas J, MD  05/01/23 0906

## 2023-04-30 NOTE — ED NOTES
"Patient resting on side. RN at bedside and patient opened her eyes. Patient reports her headache pain is relieved, stated \"so much better!\"   "

## 2023-05-01 ASSESSMENT — ENCOUNTER SYMPTOMS
VOMITING: 0
COUGH: 0
ABDOMINAL PAIN: 0
NUMBNESS: 0
DIZZINESS: 0
CHILLS: 0
NAUSEA: 0
SPEECH DIFFICULTY: 0
FEVER: 0
CONFUSION: 0
WEAKNESS: 0
DIARRHEA: 0
WOUND: 1
HEADACHES: 1
DYSURIA: 0
LIGHT-HEADEDNESS: 0
SHORTNESS OF BREATH: 0

## 2024-02-23 ENCOUNTER — OFFICE VISIT (OUTPATIENT)
Dept: URGENT CARE | Facility: URGENT CARE | Age: 44
End: 2024-02-23
Payer: COMMERCIAL

## 2024-02-23 VITALS
SYSTOLIC BLOOD PRESSURE: 117 MMHG | DIASTOLIC BLOOD PRESSURE: 68 MMHG | TEMPERATURE: 100.2 F | HEART RATE: 104 BPM | RESPIRATION RATE: 16 BRPM | WEIGHT: 174 LBS | OXYGEN SATURATION: 97 %

## 2024-02-23 DIAGNOSIS — R07.0 THROAT PAIN: ICD-10-CM

## 2024-02-23 DIAGNOSIS — R60.9 SWELLING: ICD-10-CM

## 2024-02-23 DIAGNOSIS — J10.1 INFLUENZA B: Primary | ICD-10-CM

## 2024-02-23 DIAGNOSIS — R05.1 ACUTE COUGH: ICD-10-CM

## 2024-02-23 LAB
ALBUMIN SERPL BCG-MCNC: 4.7 G/DL (ref 3.5–5.2)
ALP SERPL-CCNC: 86 U/L (ref 40–150)
ALT SERPL W P-5'-P-CCNC: 62 U/L (ref 0–50)
ANION GAP SERPL CALCULATED.3IONS-SCNC: 11 MMOL/L (ref 7–15)
AST SERPL W P-5'-P-CCNC: 45 U/L (ref 0–45)
BASOPHILS # BLD AUTO: 0 10E3/UL (ref 0–0.2)
BASOPHILS NFR BLD AUTO: 0 %
BILIRUB SERPL-MCNC: 0.4 MG/DL
BUN SERPL-MCNC: 6.5 MG/DL (ref 6–20)
CALCIUM SERPL-MCNC: 9.6 MG/DL (ref 8.6–10)
CHLORIDE SERPL-SCNC: 99 MMOL/L (ref 98–107)
CREAT SERPL-MCNC: 0.75 MG/DL (ref 0.51–0.95)
DEPRECATED HCO3 PLAS-SCNC: 26 MMOL/L (ref 22–29)
DEPRECATED S PYO AG THROAT QL EIA: NEGATIVE
EGFRCR SERPLBLD CKD-EPI 2021: >90 ML/MIN/1.73M2
EOSINOPHIL # BLD AUTO: 0 10E3/UL (ref 0–0.7)
EOSINOPHIL NFR BLD AUTO: 0 %
ERYTHROCYTE [DISTWIDTH] IN BLOOD BY AUTOMATED COUNT: 13.3 % (ref 10–15)
FLUAV AG SPEC QL IA: NEGATIVE
FLUBV AG SPEC QL IA: POSITIVE
GLUCOSE SERPL-MCNC: 100 MG/DL (ref 70–99)
GROUP A STREP BY PCR: NOT DETECTED
HCT VFR BLD AUTO: 45.1 % (ref 35–47)
HGB BLD-MCNC: 14.7 G/DL (ref 11.7–15.7)
IMM GRANULOCYTES # BLD: 0 10E3/UL
IMM GRANULOCYTES NFR BLD: 0 %
LYMPHOCYTES # BLD AUTO: 1.1 10E3/UL (ref 0.8–5.3)
LYMPHOCYTES NFR BLD AUTO: 22 %
MCH RBC QN AUTO: 29.5 PG (ref 26.5–33)
MCHC RBC AUTO-ENTMCNC: 32.6 G/DL (ref 31.5–36.5)
MCV RBC AUTO: 90 FL (ref 78–100)
MONOCYTES # BLD AUTO: 0.6 10E3/UL (ref 0–1.3)
MONOCYTES NFR BLD AUTO: 12 %
NEUTROPHILS # BLD AUTO: 3.5 10E3/UL (ref 1.6–8.3)
NEUTROPHILS NFR BLD AUTO: 66 %
PLATELET # BLD AUTO: 250 10E3/UL (ref 150–450)
POTASSIUM SERPL-SCNC: 4 MMOL/L (ref 3.4–5.3)
PROT SERPL-MCNC: 8 G/DL (ref 6.4–8.3)
RBC # BLD AUTO: 4.99 10E6/UL (ref 3.8–5.2)
SODIUM SERPL-SCNC: 136 MMOL/L (ref 135–145)
TSH SERPL DL<=0.005 MIU/L-ACNC: 1.76 UIU/ML (ref 0.3–4.2)
WBC # BLD AUTO: 5.2 10E3/UL (ref 4–11)

## 2024-02-23 PROCEDURE — 87804 INFLUENZA ASSAY W/OPTIC: CPT | Performed by: PHYSICIAN ASSISTANT

## 2024-02-23 PROCEDURE — 84443 ASSAY THYROID STIM HORMONE: CPT | Performed by: PHYSICIAN ASSISTANT

## 2024-02-23 PROCEDURE — 85025 COMPLETE CBC W/AUTO DIFF WBC: CPT | Performed by: PHYSICIAN ASSISTANT

## 2024-02-23 PROCEDURE — 36415 COLL VENOUS BLD VENIPUNCTURE: CPT | Performed by: PHYSICIAN ASSISTANT

## 2024-02-23 PROCEDURE — 87651 STREP A DNA AMP PROBE: CPT | Performed by: PHYSICIAN ASSISTANT

## 2024-02-23 PROCEDURE — 99204 OFFICE O/P NEW MOD 45 MIN: CPT | Performed by: PHYSICIAN ASSISTANT

## 2024-02-23 PROCEDURE — 80053 COMPREHEN METABOLIC PANEL: CPT | Performed by: PHYSICIAN ASSISTANT

## 2024-02-23 RX ORDER — BENZONATATE 100 MG/1
CAPSULE ORAL
Qty: 45 CAPSULE | Refills: 0 | Status: SHIPPED | OUTPATIENT
Start: 2024-02-23

## 2024-02-23 NOTE — LETTER
February 23, 2024      Shanna Orta  [address]  [facility] [address]        To Whom It May Concern:    Shanna Orta  was seen  in the clinic today. Please excuse them from work/school until symptoms improve ans she is 24 hurs fever free without medication.            Sincerely,        Darien Murray PA-C

## 2024-02-23 NOTE — PROGRESS NOTES
Chief Complaint   Patient presents with    Urgent Care    URI     Cough, sore throat, runny nose, body aches and fever x 3 days - states her child was positive for strep and influenza - Negative at home Covid test last night - Took Bendryl, acetaminophen and Certirizine yesterday       ASSESSMENT/PLAN:  Shanna was seen today for urgent care and uri.    Diagnoses and all orders for this visit:    Influenza B  -     benzonatate (TESSALON) 100 MG capsule; Take 1-2 capsules by mouth up to 3 x a day as needed with food    Acute cough  -     Influenza A & B Antigen - Clinic Collect    Throat pain  -     Streptococcus A Rapid Screen w/Reflex to PCR - Clinic Collect  -     Group A Streptococcus PCR Throat Swab    Swelling  -     CBC with platelets and differential; Future  -     Comprehensive metabolic panel (BMP + Alb, Alk Phos, ALT, AST, Total. Bili, TP); Future  -     TSH with free T4 reflex; Future  -     CBC with platelets and differential  -     Comprehensive metabolic panel (BMP + Alb, Alk Phos, ALT, AST, Total. Bili, TP)  -     TSH with free T4 reflex    Flu be positive.  Strep negative.  Reassuring exam.  No pitting edema noted.  No calf tenderness.  Doubt PE.  Will check some labs due to the reported sensation of swelling.  Up 10 pounds since last year.  Recommend scheduling an appointment with PCP in 1 to 2 weeks.  If feeling 100% better okay to cancel but they can follow-up with the swelling.  The influenza should resolve over the next several days  Symptomatic cares and expected length of symptoms discussed at length and outlined in AVS  Return precautions also discussed    Darien Murray PA-C      SUBJECTIVE:  Shanna is a 44 year old female who presents to urgent care with 3 days of cough, sore throat, runny nose, fever and bodyaches.  She has fatigue and malaise.  Her child tested positive for strep and flu.  She is also felt low appetite and over the last week she is felt generalized swelling.  Her pants  do not seem to be fitting her as well.  She states she normally weighs in the 150s but it noticed that her weight was 174 today.  Looking back in her records she had an annual exam 5/24/2023 and her weight was 164  No chest pain, shortness of breath.      ROS: Pertinent ROS neg other than the symptoms noted above in the HPI.     OBJECTIVE:  /68 (BP Location: Left arm, Patient Position: Sitting, Cuff Size: Adult Large)   Pulse 104   Temp 100.2  F (37.9  C) (Tympanic)   Resp 16   Wt 78.9 kg (174 lb)   SpO2 97%   Breastfeeding No    GENERAL: alert and no distress  EYES: Eyes grossly normal to inspection, PERRL and conjunctivae and sclerae normal  HENT: ear canals and TM's normal, nose and mouth without ulcers or lesions, no oropharynx erythema  RESP: lungs clear to auscultation - no rales, rhonchi or wheezes  CV: regular rate and rhythm, normal S1 S2, no S3 or S4, no murmur, click or rub, no peripheral edema   MS: no gross musculoskeletal defects noted, no edema, no calf tenderness  SKIN: no suspicious lesions or rashes  NEURO: Normal strength and tone, mentation intact and speech normal    DIAGNOSTICS    Results for orders placed or performed in visit on 02/23/24   Influenza A & B Antigen - Clinic Collect     Status: Abnormal    Specimen: Nose; Swab   Result Value Ref Range    Influenza A antigen Negative Negative    Influenza B antigen Positive (A) Negative    Narrative    Test results must be correlated with clinical data. If necessary, results should be confirmed by a molecular assay or viral culture.   Streptococcus A Rapid Screen w/Reflex to PCR - Clinic Collect     Status: Normal    Specimen: Throat; Swab   Result Value Ref Range    Group A Strep antigen Negative Negative   CBC with platelets and differential     Status: None (In process)    Narrative    The following orders were created for panel order CBC with platelets and differential.  Procedure                               Abnormality          Status                     ---------                               -----------         ------                     CBC with platelets and d...[[id number]]                      In process                   Please view results for these tests on the individual orders.        Current Outpatient Medications   Medication    diphenhydrAMINE (BENADRYL) 25 MG capsule    doxycycline monohydrate (MONODOX) 100 MG capsule    UNABLE TO FIND     No current facility-administered medications for this visit.      Patient Active Problem List   Diagnosis    Visit for suture removal      No past medical history on file.  No past surgical history on file.  No family history on file.  Social History     Tobacco Use    Smoking status: Never    Smokeless tobacco: Never   Substance Use Topics    Alcohol use: Not on file              The plan of care was discussed with the patient. They understand and agree with the course of treatment prescribed. A printed summary was given including instructions and medications.  The use of Dragon/[employer] dictation services may have been used to construct the content in this note; any grammatical or spelling errors are non-intentional. Please contact the author of this note directly if you are in need of any clarification.

## 2024-02-23 NOTE — PATIENT INSTRUCTIONS
You have Influenza This commonly causes symptoms of your throat, nose, sinuses and bronchi.    You do not need to do anything besides rest and hydrate and your body will get over this.  Sometimes it can take up to 2 weeks to do so.  And the symptoms can be very annoying.    People are commonly contagious for about 3 to 5 days.  Wearing a mask will significantly reduce your risk of transmitting this to someone else if you are within that timeframe.    Some things that you can do to help with your symptoms include:    Pain, malaise and inflammation:  Ibuprofen and Tylenol for pain and inflammation.  I prefer ibuprofen  Ibuprofen 400-600 mg (2-3 of the 200 mg OTC tablets or 400-600 mg of the children's liquid) up to 4 times daily with food or milk  Tylenol 500-1000 mg every 8 hours as needed    For nasal congestion and drainage  Flonase/fluticasone nasal spray 2 sprays in each nostril once a day for 1 - 4 weeks.  This may take several days to become effective.  Consider saline nasal rinses  Vicks VapoRub  Humidified air or steam    Cough:  Mucinex or guaifenesin can help get mucus out of your body and help with cough.  Dextromethorphan is a cough suppressant that may be helpful  Tessalon Perles may be prescribed  Vicks VapoRub  Humidified air or steam  Teaspoon of honey    Ear fullness or pain:  Flonase as above  Ibuprofen as above  Otovent, which is a balloon you blow up with your nose and helps pop the ears and regulate the pressure can be bought off of [employer] and works quite well    Supplements that may also be helpful:  Cold snap  Zicam  Zinc  Warm beverages, teas    Be sure to eat nutrient dense foods with a good mixture of fats, carbohydrates and proteins.  Your body burns more calories while sick.